# Patient Record
Sex: FEMALE | Race: WHITE | NOT HISPANIC OR LATINO | Employment: FULL TIME | URBAN - METROPOLITAN AREA
[De-identification: names, ages, dates, MRNs, and addresses within clinical notes are randomized per-mention and may not be internally consistent; named-entity substitution may affect disease eponyms.]

---

## 2019-05-21 ENCOUNTER — OFFICE VISIT (OUTPATIENT)
Dept: FAMILY MEDICINE CLINIC | Facility: CLINIC | Age: 34
End: 2019-05-21

## 2019-05-21 VITALS
SYSTOLIC BLOOD PRESSURE: 104 MMHG | DIASTOLIC BLOOD PRESSURE: 62 MMHG | OXYGEN SATURATION: 99 % | BODY MASS INDEX: 22.61 KG/M2 | HEIGHT: 63 IN | HEART RATE: 87 BPM | WEIGHT: 127.6 LBS | TEMPERATURE: 98.2 F

## 2019-05-21 DIAGNOSIS — J30.2 SEASONAL ALLERGIES: Primary | ICD-10-CM

## 2019-05-21 DIAGNOSIS — N93.9 VAGINAL SPOTTING: ICD-10-CM

## 2019-05-21 PROCEDURE — 99203 OFFICE O/P NEW LOW 30 MIN: CPT | Performed by: NURSE PRACTITIONER

## 2019-05-21 RX ORDER — LORATADINE 10 MG/1
10 CAPSULE, LIQUID FILLED ORAL DAILY
COMMUNITY

## 2019-05-21 RX ORDER — FLUTICASONE PROPIONATE 50 MCG
2 SPRAY, SUSPENSION (ML) NASAL DAILY
Qty: 1 BOTTLE | Refills: 11 | Status: SHIPPED | OUTPATIENT
Start: 2019-05-21

## 2019-05-21 NOTE — PROGRESS NOTES
Subjective   Luis Burns is a 33 y.o. female here to establish care.  Chief Complaint   Patient presents with   • Establish Care     8 week pregnant    • Sinusitis     ongoing problem X 2 years       History of Present Illness   Patient is here to establish care, has not had a PCP, is 8 weeks pregnant, has had first visit with OB/GYN, Dr. Alisia Renteria with [facility].   Here with complaint of sinus pressure and headache that has been going on intermittently for 2 years. Started after moving to this area 3 years ago. She has taken loratadine for 2 days, and has used a nasal decongestant; has never had allergy testing in the past and does not feel that is necessary at this time. She has 2 children at home, younger is 10 yo, patient is healthy with no other complaints.   She is considering switching OB to [facility], may need help with referral if she decides on this. She has had occasional vaginal spotting, was evaluated by OB, had normal US, was told to monitor. She did have lab tests drawn.  The following portions of the patient's history were reviewed and updated as appropriate: allergies, current medications, past family history, past medical history, past social history, past surgical history and problem list.    Review of Systems   Constitutional: Positive for fatigue. Negative for chills and fever.   HENT: Positive for congestion. Negative for ear pain, postnasal drip, rhinorrhea and sore throat.    Eyes: Negative for discharge and itching.   Respiratory: Positive for shortness of breath (with exertion since pregnancy). Negative for cough and wheezing.    Cardiovascular: Negative for chest pain and palpitations.   Gastrointestinal: Positive for nausea (morning sickness). Negative for abdominal pain, blood in stool, constipation, diarrhea and vomiting.   Endocrine: Negative for cold intolerance and heat intolerance.   Genitourinary: Positive for vaginal bleeding (occ spotting, US OK at OB/GYN).  "Negative for difficulty urinating and dysuria.   Musculoskeletal: Negative for arthralgias, back pain and myalgias.   Skin: Negative for color change, pallor, rash and wound.   Allergic/Immunologic: Positive for environmental allergies.   Neurological: Positive for dizziness (occ with standing) and headaches.   Psychiatric/Behavioral: Negative for dysphoric mood and sleep disturbance. The patient is not nervous/anxious.      Blood pressure 104/62, pulse 87, temperature 98.2 °F (36.8 °C), temperature source Oral, height 158.8 cm (62.5\"), weight 57.9 kg (127 lb 9.6 oz), SpO2 99 %.    No Known Allergies  Past Medical History:   Diagnosis Date   • Migraines    • Seasonal allergies      Past Surgical History:   Procedure Laterality Date   •  SECTION      X 2   • CHOLECYSTECTOMY       Family History   Problem Relation Age of Onset   • Heart disease Mother    • Diabetes Mother    • Uterine cancer Mother    • Hypertension Father    • Migraines Father      Social History     Socioeconomic History   • Marital status:      Spouse name: Not on file   • Number of children: Not on file   • Years of education: Not on file   • Highest education level: Not on file   Tobacco Use   • Smoking status: Never Smoker   • Smokeless tobacco: Never Used   Substance and Sexual Activity   • Alcohol use: No     Frequency: Never   • Drug use: No   • Sexual activity: Yes     Partners: Male     Birth control/protection: None       There is no immunization history on file for this patient.    Current Outpatient Medications:   •  Loratadine 10 MG capsule, Take 10 mg by mouth Daily., Disp: , Rfl:   •  Prenatal w/o A Vit-Fe Fum-FA (PRENATA PO), Take  by mouth Daily., Disp: , Rfl:   •  fluticasone (FLONASE) 50 MCG/ACT nasal spray, 2 sprays into the nostril(s) as directed by provider Daily., Disp: 1 bottle, Rfl: 11    Objective   Physical Exam   Constitutional: She is oriented to person, place, and time. She appears well-developed and " well-nourished. No distress.   HENT:   Head: Normocephalic and atraumatic.   Right Ear: External ear normal. Tympanic membrane is not erythematous and not bulging. A middle ear effusion is present.   Left Ear: External ear normal. Tympanic membrane is not erythematous and not bulging. A middle ear effusion is present.   Nose: Mucosal edema present. Right sinus exhibits no maxillary sinus tenderness and no frontal sinus tenderness. Left sinus exhibits no maxillary sinus tenderness and no frontal sinus tenderness.   Mouth/Throat: Uvula is midline. Posterior oropharyngeal erythema present. No oropharyngeal exudate, posterior oropharyngeal edema or tonsillar abscesses.   Eyes: Conjunctivae and EOM are normal. Pupils are equal, round, and reactive to light. Right eye exhibits no discharge. Left eye exhibits no discharge. No scleral icterus.   Cardiovascular: Normal rate, regular rhythm and normal heart sounds.   No murmur heard.  Pulmonary/Chest: Effort normal and breath sounds normal. No stridor. No respiratory distress. She has no wheezes.   Abdominal: Soft.   Musculoskeletal: She exhibits no edema.   Lymphadenopathy:     She has no cervical adenopathy.   Neurological: She is alert and oriented to person, place, and time. No cranial nerve deficit.   Skin: Skin is warm and dry. Capillary refill takes less than 2 seconds. She is not diaphoretic.   Psychiatric: She has a normal mood and affect. Her behavior is normal. Judgment and thought content normal.   Vitals reviewed.      Assessment/Plan   Luis was seen today for establish care and sinusitis.    Diagnoses and all orders for this visit:    Seasonal allergies  -     fluticasone (FLONASE) 50 MCG/ACT nasal spray; 2 sprays into the nostril(s) as directed by provider Daily.    Vaginal spotting      New Medications Ordered This Visit   Medications   • fluticasone (FLONASE) 50 MCG/ACT nasal spray     Si sprays into the nostril(s) as directed by provider Daily.      Dispense:  1 bottle     Refill:  11       I reviewed medication pregnancy categories in the [employer] brian, claritin/loratadine and Flonase are safe in pregnancy. Provided information for sinus rinse, advised sterile or distilled water, do not use tap water. Avoid nasal decongestants since this can cause rebound congestion, use Flonase instead. Do not take any other OTC meds unless approved by OB.  Follow up with OB/GYN about spotting, go to ER if worsens or cramping occurs. May call if she wants referral to OB in [facility].    Records requested from OB. Further recommendations pending review.

## 2019-05-21 NOTE — PATIENT INSTRUCTIONS
Nasal Allergies  Nasal allergies are a reaction to allergens in the air. Allergens are tiny specks (particles) in the air that cause your body to have an allergic reaction. Nasal allergies are not passed from person to person (contagious). They cannot be cured, but they can be controlled. Common causes of nasal allergies include:  · Pollen from grasses, trees, and weeds.  · House dust mites.  · Pet dander.  · Mold.    Follow these instructions at home:  · Avoid the allergen that is causing your symptoms, if you can.  · Keep windows closed. If possible, use air conditioning when there is a lot of pollen in the air.  · Do not use fans in your home.  · Do not hang clothes outside to dry.  · Wear sunglasses to keep pollen out of your eyes.  · Wash your hands right away after you touch household pets.  · Take over-the-counter and prescription medicines only as told by your doctor.  · Keep all follow-up visits as told by your doctor. This is important.  Contact a doctor if:  · You have a fever.  · You have a cough that does not go away (is persistent).  · You start to make whistling sounds when you breathe (wheeze).  · Your symptoms do not get better with treatment.  · You have thick fluid coming from your nose.  · You start to have nosebleeds.  Get help right away if:  · Your tongue or your lips are swollen.  · You have trouble breathing.  · You feel light-headed or you feel like you are going to pass out (faint).  · You have cold sweats.  This information is not intended to replace advice given to you by your health care provider. Make sure you discuss any questions you have with your health care provider.  Document Released: 04/18/2012 Document Revised: 05/25/2017 Document Reviewed: 06/29/2016  [employer] Interactive Patient Education © 2019 [employer]    Allergies, Adult  An allergy means that your body reacts to something that bothers it (allergen). It is not a normal reaction. This can happen from something that  you:  · Eat.  · Breathe in.  · Touch.    You can have an allergy (be allergic) to:  · Outdoor things, like:  ? Pollen.  ? Grass.  ? Weeds.  · Indoor things, like:  ? Dust.  ? Smoke.  ? Pet dander.  · Foods.  · Medicines.  · Things that bother your skin, like:  ? Detergents.  ? Chemicals.  ? Latex.  · Perfume.  · Bugs.    An allergy cannot spread from person to person (is not contagious).  Follow these instructions at home:  · Stay away from things that you know you are allergic to.  · If you have allergies to things in the air, wash out your nose each day. Do it with one of these:  ? A salt-water (saline) spray.  ? A container (neti pot).  · Take over-the-counter and prescription medicines only as told by your doctor.  · Keep all follow-up visits as told by your doctor. This is important.  · If you are at risk for a very bad allergy reaction (anaphylaxis), keep an auto-injector with you all the time. This is called an epinephrine injection.  ? This is pre-measured medicine with a needle. You can put it into your skin by yourself.  ? Right after you have a very bad allergy reaction, you or a person with you must give the medicine in less than a few minutes. This is an emergency.  · If you have ever had a very bad allergy reaction, wear a medical alert bracelet or necklace. Your very bad allergy should be written on it.  Contact a health care provider if:  · Your symptoms do not get better with treatment.  Get help right away if:  · You have symptoms of a very bad allergy reaction. These include:  ? A swollen mouth, tongue, or throat.  ? Pain or tightness in your chest.  ? Trouble breathing.  ? Being short of breath.  ? Dizziness.  ? Fainting.  ? Very bad pain in your belly (abdomen).  ? Throwing up (vomiting).  ? Watery poop (diarrhea).  Summary  · An allergy means that your body reacts to something that bothers it (allergen). It is not a normal reaction.  · Stay away from things that make your body react.  · Take  over-the-counter and prescription medicines only as told by your doctor.  · If you are at risk for a very bad allergy reaction, carry an auto-injector (epinephrine injection) all the time. Also, wear a medical alert bracelet or necklace so people know about your allergy.  This information is not intended to replace advice given to you by your health care provider. Make sure you discuss any questions you have with your health care provider.  Document Released: 04/14/2014 Document Revised: 04/02/2018 Document Reviewed: 04/02/2018  [employer] Interactive Patient Education © 2019 [employer]    Sinus Headache  A sinus headache happens when your sinuses become clogged or swollen. You may feel pain or pressure in your face, forehead, ears, or upper teeth. Sinus headaches can be mild or severe.  Follow these instructions at home:  · Take medicines only as told by your doctor.  · If you were given an antibiotic medicine, finish all of it even if you start to feel better.  · Use a nose spray if you feel stuffed up (congested).  · If told, apply a warm, moist washcloth to your face to help lessen pain.  Contact a doctor if:  · You get headaches more than one time each week.  · Light or sound bothers you.  · You have a fever.  · You feel sick to your stomach (nauseous) or you throw up (vomit).  · Your headaches do not get better with treatment.  Get help right away if:  · You have trouble seeing.  · You suddenly have very bad pain in your face or head.  · You start to twitch or shake (seizure).  · You are confused.  · You have a stiff neck.  This information is not intended to replace advice given to you by your health care provider. Make sure you discuss any questions you have with your health care provider.  Document Released: 04/18/2012 Document Revised: 08/13/2017 Document Reviewed: 12/14/2015  [employer] Interactive Patient Education © 2018 [employer]    How to Perform a Sinus Rinse  A sinus rinse is a home treatment. It  rinses your sinuses with a mixture of salt and water (saline solution). Sinuses are air-filled spaces in your skull behind the bones of your face and forehead. They open into your nasal cavity.  A sinus rinse can help to clear your nasal cavity. It can clear mucus, dirt, dust, or pollen.  You may do a sinus rinse when you have:  · A cold.  · A virus.  · Allergies.  · A sinus infection.  · A stuffy nose.    Talk with your doctor about whether a sinus rinse might help you.  What are the risks?  A sinus rinse is normally very safe and helpful. However, there are a few risks. These include:  · A burning feeling in the sinuses. This may happen if you do not make the saline solution as instructed. Be sure to follow all directions when making the saline solution.  · Nasal irritation.  · Infection from unclean water. This is rare, but possible.    Do not do a sinus rinse if you have had:  · Ear or nasal surgery.  · An ear infection.  · Blocked ears.    Supplies needed:  · Saline solution or powder.  · Distilled or germ-free (sterile) water may be needed to mix with saline powder.  ? You may use boiled and cooled tap water. Boil tap water for 5 minutes; cool until it is lukewarm. Use within 24 hours.  ? Do not use regular tap water to mix with the saline solution.  · Neti pot or nasal rinse bottle. This releases the saline solution into your nose and through your sinuses. You can buy neti pots and rinse bottles:  ? At your local pharmacy.  ? At a health food store.  ? Online.  How to perform a sinus rinse  1. Wash your hands with soap and water.  2. Wash your device using the directions that came with it.  3. Dry your device.  4. Use the solution that comes with your device or one that is sold separately in stores. Follow the mixing directions on the package if you need to mix with sterile or distilled water.  5. Fill your device with the amount of saline solution stated in the device instructions.  6. Stand over a sink and  tilt your head sideways over the sink.  7. Place the spout of the device in your upper nostril (the one closer to the ceiling).  8. Gently pour or squeeze the saline solution into your nasal cavity. The liquid should drain to your lower nostril if you are not too stuffed up (congested).  9. While rinsing, breathe through your open mouth.  10. Gently blow your nose to clear any mucus and rinse solution. Blowing too hard may cause ear pain.  11. Repeat in your other nostril.  12. Clean and rinse your device with clean water.  13. Air-dry your device.  Talk with your doctor or pharmacist if you have questions about how to do a sinus rinse.  Summary  · A sinus rinse is a home treatment. It rinses your sinuses with a mixture of salt and water (saline solution).  · A sinus rinse is normally very safe and helpful. Follow all instructions carefully.  · Talk with your doctor about whether a sinus rinse might help you.  This information is not intended to replace advice given to you by your health care provider. Make sure you discuss any questions you have with your health care provider.  Document Released: 07/15/2015 Document Revised: 10/15/2018 Document Reviewed: 10/15/2018  [employer] Interactive Patient Education © 2019 [employer]    Morning Sickness  Morning sickness is when you feel sick to your stomach (nauseous) during pregnancy. You may feel sick to your stomach and throw up (vomit). You may feel sick in the morning, but you can feel this way at any time of day. Some women feel very sick to their stomach and cannot stop throwing up (hyperemesis gravidarum).  Follow these instructions at home:  Medicines  · Take over-the-counter and prescription medicines only as told by your doctor. Do not take any medicines until you talk with your doctor about them first.  · Taking multivitamins before getting pregnant can stop or lessen the harshness of morning sickness.  Eating and drinking  · Eat dry toast or crackers before  getting out of bed.  · Eat 5 or 6 small meals a day.  · Eat dry and bland foods like rice and baked potatoes.  · Do not eat greasy, fatty, or spicy foods.  · Have someone cook for you if the smell of food causes you to feel sick or throw up.  · If you feel sick to your stomach after taking prenatal vitamins, take them at night or with a snack.  · Eat protein when you need a snack. Nuts, yogurt, and cheese are good choices.  · Drink fluids throughout the day.  · Try ginger ale made with real ginger, ginger tea made from fresh grated ginger, or ginger candies.  General instructions  · Do not use any products that have nicotine or tobacco in them, such as cigarettes and e-cigarettes. If you need help quitting, ask your doctor.  · Use an air purifier to keep the air in your house free of smells.  · Get lots of fresh air.  · Try to avoid smells that make you feel sick.  · Try:  ? Wearing a bracelet that is used for seasickness (acupressure wristband).  ? Going to a doctor who puts thin needles into certain body points (acupuncture) to improve how you feel.  Contact a doctor if:  · You need medicine to feel better.  · You feel dizzy or light-headed.  · You are losing weight.  Get help right away if:  · You feel very sick to your stomach and cannot stop throwing up.  · You pass out (faint).  · You have very bad pain in your belly.  Summary  · Morning sickness is when you feel sick to your stomach (nauseous) during pregnancy.  · You may feel sick in the morning, but you can feel this way at any time of day.  · Making some changes to what you eat may help your symptoms go away.  This information is not intended to replace advice given to you by your health care provider. Make sure you discuss any questions you have with your health care provider.  Document Released: 01/25/2006 Document Revised: 01/18/2018 Document Reviewed: 01/18/2018  [employer] Interactive Patient Education © 2019 [employer]    Prenatal Care  What is  prenatal care?  Prenatal care is the process of caring for a pregnant woman before she gives birth. Prenatal care makes sure that she and her baby remain as healthy as possible throughout pregnancy. Prenatal care may be provided by a midwife, family practice health care provider, or a childbirth and pregnancy specialist (obstetrician). Prenatal care may include physical examinations, testing, treatments, and education on nutrition, lifestyle, and social support services.  Why is prenatal care so important?  Early and consistent prenatal care increases the chance that you and your baby will remain healthy throughout your pregnancy. This type of care also decreases a baby’s risk of being born too early (prematurely), or being born smaller than expected (small for gestational age). Any underlying medical conditions you may have that could pose a risk during your pregnancy are discussed during prenatal care visits. You will also be monitored regularly for any new conditions that may arise during your pregnancy so they can be treated quickly and effectively.  What may happen during prenatal care visits?  Discussion  Tell your health care provider about any new signs or symptoms you have experienced since your last visit. These might include:  · Nausea or vomiting.  · Increased or decreased level of energy.  · Difficulty sleeping.  · Back or leg pain.  · Weight changes.  · Frequent urination.  · Shortness of breath with physical activity.  · Changes in your skin, such as the development of a rash or itchiness.  · Vaginal discharge or bleeding.  · Feelings of excitement or nervousness.  · Changes in your baby’s movements.    You may want to write down any questions or topics you want to discuss with your health care provider and bring them with you to your appointment.  Examination  During your first prenatal care visit, you will likely have a complete physical exam. Your health care provider will often examine your  vagina, cervix, and the position of your uterus, as well as check your heart, lungs, and other body systems. As your pregnancy progresses, your health care provider will measure the size of your uterus and your baby’s position inside your uterus. He or she may also examine you for early signs of labor. Your prenatal visits may also include checking your blood pressure and, after about 10-12 weeks of pregnancy, listening to your baby’s heartbeat.  Testing  Regular testing often includes:  · Urinalysis. This checks your urine for glucose, protein, or signs of infection.  · Blood count. This checks the levels of white and red blood cells in your body.  · Tests for sexually transmitted infections (STIs). Testing for STIs at the beginning of pregnancy is routinely done and is required in many states.  · Antibody testing. You will be checked to see if you are immune to certain illnesses, such as rubella, that can affect a developing fetus.  · Glucose screen. Around 24-28 weeks of pregnancy, your blood glucose level will be checked for signs of gestational diabetes. Follow-up tests may be recommended.  · Group B strep. This is a bacteria that is commonly found inside a woman’s vagina. This test will inform your health care provider if you need an antibiotic to reduce the amount of this bacteria in your body prior to labor and childbirth.  · Ultrasound. Many pregnant women undergo an ultrasound screening around 18-20 weeks of pregnancy to evaluate the health of the fetus and check for any developmental abnormalities.  · HIV (human immunodeficiency virus) testing. Early in your pregnancy, you will be screened for HIV. If you are at high risk for HIV, this test may be repeated during your third trimester of pregnancy.    You may be offered other testing based on your age, personal or family medical history, or other factors.  How often should I plan to see my health care provider for prenatal care?  Your prenatal care  check-up schedule depends on any medical conditions you have before, or develop during, your pregnancy. If you do not have any underlying medical conditions, you will likely be seen for checkups:  · Monthly, during the first 6 months of pregnancy.  · Twice a month during months 7 and 8 of pregnancy.  · Weekly starting in the 9th month of pregnancy and until delivery.    If you develop signs of early labor or other concerning signs or symptoms, you may need to see your health care provider more often. Ask your health care provider what prenatal care schedule is best for you.  What can I do to keep myself and my baby as healthy as possible during my pregnancy?    · Take a prenatal vitamin containing 400 micrograms (0.4 mg) of folic acid every day. Your health care provider may also ask you to take additional vitamins such as iodine, vitamin D, iron, copper, and zinc.  · Take 1262-1259 mg of calcium daily starting at your 20th week of pregnancy until you deliver your baby.  · Make sure you are up to date on your vaccinations. Unless directed otherwise by your health care provider:  ? You should receive a tetanus, diphtheria, and pertussis (Tdap) vaccination between the 27th and 36th week of your pregnancy, regardless of when your last Tdap immunization occurred. This helps protect your baby from whooping cough (pertussis) after he or she is born.  ? You should receive an annual inactivated influenza vaccine (IIV) to help protect you and your baby from influenza. This can be done at any point during your pregnancy.  · Eat a well-rounded diet that includes:  ? Fresh fruits and vegetables.  ? Lean proteins.  ? Calcium-rich foods such as milk, yogurt, hard cheeses, and dark, leafy greens.  ? Whole grain breads.  · Do not eat seafood high in mercury, including:  ? Swordfish.  ? Tilefish.  ? Shark.  ? Lang mackerel.  ? More than 6 oz tuna per week.  · Do not eat:  ? Raw or undercooked meats or eggs.  ? Unpasteurized foods,  such as soft cheeses (brie, blue, or feta), juices, and milks.  ? Lunch meats.  ? Hot dogs that have not been heated until they are steaming.  · Drink enough water to keep your urine clear or pale yellow. For many women, this may be 10 or more 8 oz glasses of water each day. Keeping yourself hydrated helps deliver nutrients to your baby and may prevent the start of pre-term uterine contractions.  · Do not use any tobacco products including cigarettes, chewing tobacco, or electronic cigarettes. If you need help quitting, ask your health care provider.  · Do not drink beverages containing alcohol. No safe level of alcohol consumption during pregnancy has been determined.  · Do not use any illegal drugs. These can harm your developing baby or cause a miscarriage.  · Ask your health care provider or pharmacist before taking any prescription or over-the-counter medicines, herbs, or supplements.  · Limit your caffeine intake to no more than 200 mg per day.  · Exercise. Unless told otherwise by your health care provider, try to get 30 minutes of moderate exercise most days of the week. Do not  do high-impact activities, contact sports, or activities with a high risk of falling, such as horseback riding or downhill skiing.  · Get plenty of rest.  · Avoid anything that raises your body temperature, such as hot tubs and saunas.  · If you own a cat, do not empty its litter box. Bacteria contained in cat feces can cause an infection called toxoplasmosis. This can result in serious harm to the fetus.  · Stay away from chemicals such as insecticides, lead, mercury, and cleaning or paint products that contain solvents.  · Do not have any X-rays taken unless medically necessary.  · Take a childbirth and breastfeeding preparation class. Ask your health care provider if you need a referral or recommendation.  This information is not intended to replace advice given to you by your health care provider. Make sure you discuss any  questions you have with your health care provider.  Document Released: 12/20/2004 Document Revised: 01/29/2018 Document Reviewed: 03/04/2015  [employer] Interactive Patient Education © 2019 [employer]    Preventing Birth Defects During Pregnancy, Adult  Birth defects (congenital malformations) occur when part of a baby's body does not grow or develop correctly during pregnancy. The most common birth defects are defects of the heart, brain, spine, spinal cord (neural tube defects), and Down syndrome.  Some birth defects have little impact on your baby as he or she grows. Others can lead to more severe, lifelong (chronic) disabilities. Birth defects can affect your baby physically, mentally, and emotionally and can impact learning abilities.  What can cause or increase my baby's risk for birth defects?  The following factors increase your risk for having a baby with a birth defect:  · Living in a low-income family or community.  · Not taking prenatal vitamins.  · Not eating bread or grain products with added folic acid (fortified).  · Being related by blood to the father of the baby.    Most birth defects have no known cause. However, the following factors can either cause or increase your baby's risk for birth defects:  · Genes that you or the father pass along to your baby.  · Abnormal changes in genes as your baby grows during pregnancy.  · Infections during or shortly before pregnancy, such as syphilis, toxoplasmosis, cytomegalovirus, Zika, influenza, or rubella.  · Eating an unhealthy diet that lacks important nutrients, specifically iodine and folate.  · Getting too much vitamin A.  · Certain medicines.  · Drinking alcohol, smoking, or using street drugs during pregnancy.  · Certain medical conditions, such as diabetes and obesity.  · Exposure to poisons (toxins) such as radiation, certain chemicals, lead, or mercury.    What are common disabilities among children with birth defects?  Babies born with birth  defects commonly have one or more of the following:  · Physical defects, such as:  ? Heart defects.  ? Cleft lip or cleft palate.  ? Deformity of the bones and spine.  ? Muscle or organ defects.  · Developmental delays, such as:  ? Learning and thinking disabilities.  ? Social and emotional delays.  ? Speech and language delays.  ? Motor delays, such as sitting up or crawling much later than other babies of the same age.  · Behavioral problems.  · Vision or hearing impairment, including blindness or deafness.    What steps can I take to protect my baby from developing a birth defect?  If you are pregnant, think you might be pregnant, or are planning to become pregnant, take the following steps to lower your baby's risk of a defect:  Eat a healthy diet  · Take prenatal vitamins and eat foods that contain folic acid. Get at least 400 mcg of folic acid daily.  · Eat a variety of healthy foods, such as:  ? Whole grain breads and cereals that are fortified with folic acid.  ? Fish that is low in mercury, such as salmon, trout, sardines, shrimp, or canned tuna.  ? Fresh fruits and vegetables.  ? Low-fat, low-cholesterol meats such as skinless chicken or lean pork.  · Cook meat and fish well. Wash your hands after handling raw fish or meat.  · Do not eat or drink unpasteurized dairy products, including soft cheeses.  Live a healthy lifestyle    · Do not drink alcohol or use illegal drugs.  · Do not use any products that contain nicotine or tobacco, such as cigarettes and e-cigarettes. If you need help quitting, ask your health care provider.  · Maintain a healthy weight. Manage your weight gain during pregnancy.  Protect yourself from infections  · Wear insect repellent. Ask your health care provider or pharmacist which insect repellents are safe to use during pregnancy.  · Wash your hands with soap and water after using the bathroom. If soap and water are not available, use hand .  · When changing diapers or  helping children use the toilet, wear latex gloves.  · Avoid cat litter. Cats can spread an infection (toxoplasmosis) through their feces.  · When gardening or digging in soil, wear gloves.  · Stay up-to-date on your vaccinations.  ? Make sure you have had your rubella and Tdap vaccinations.  ? Get a flu shot every year. It is safe to get the flu shot while you are pregnant.  Talk with your health care provider    · If you are planning to become pregnant, talk with your health care provider.  · Get pregnancy care (prenatal care) as soon as you think you are pregnant.  · Work with your health care provider to manage any conditions you have, such as diabetes.  · Talk with your health care provider about whether your medicines or supplements are safe to take during pregnancy.  · Talk with a genetic counselor about your risk of passing on birth defects or genetic conditions.  Avoid risks in your environment  · Avoid radiation.  ? Before getting an X-ray, tell the X-ray technician that you are pregnant.  ? If you are exposed to radiation, wear protection or move away from the radiation.  · Find out whether any of the chemicals or dust where you work are unsafe during pregnancy. Work with your employer to avoid dangerous chemicals during pregnancy.  · Avoid mercury. Do not eat fish that contains a lot of mercury or may contain mercury.  · Avoid lead.  ? Check the lead levels in your drinking water.  ? Avoid old paint dust or paint chips that may contain lead.  Where to find support  For more support:  · Talk with your health care provider. He or she can recommend a support group.  · Consider joining a support group for families living with birth defects.    Where to find more information  Visit the following websites to learn more about preventing birth defects.  · [employer]: www.marchofdimes.org  · Centers for Disease Control and Prevention: www.cdc.gov/ncbddd/birthdefects  · American Academy of Pediatrics:  www.aap.org  · [place] Department of Health and Human Services: www.hhs.gov    Summary  · Some birth defects are passed from parent to child (inherited). Talk with a genetic counselor about birth defects or genetic conditions that have occurred in your family or your partner's family.  · During pregnancy, you can take many steps to prevent birth defects and have a healthy pregnancy. See a health care provider and start prenatal care as soon as you know you are pregnant.  · Most birth defects do not have a known cause. If your baby is born with a birth defect, try not to blame yourself or your partner. If you feel guilt, shame, or sadness, talk with your health care provider.  This information is not intended to replace advice given to you by your health care provider. Make sure you discuss any questions you have with your health care provider.  Document Released: 01/28/2017 Document Revised: 08/12/2017 Document Reviewed: 01/28/2017  [employer] Interactive Patient Education © 2019 [employer]

## 2019-05-29 ENCOUNTER — TELEPHONE (OUTPATIENT)
Dept: FAMILY MEDICINE CLINIC | Facility: CLINIC | Age: 34
End: 2019-05-29